# Patient Record
Sex: FEMALE | Race: BLACK OR AFRICAN AMERICAN | Employment: FULL TIME | URBAN - METROPOLITAN AREA
[De-identification: names, ages, dates, MRNs, and addresses within clinical notes are randomized per-mention and may not be internally consistent; named-entity substitution may affect disease eponyms.]

---

## 2018-01-16 ENCOUNTER — OFFICE VISIT (OUTPATIENT)
Dept: OBGYN CLINIC | Age: 23
End: 2018-01-16
Payer: COMMERCIAL

## 2018-01-16 VITALS
BODY MASS INDEX: 20 KG/M2 | SYSTOLIC BLOOD PRESSURE: 106 MMHG | WEIGHT: 132 LBS | HEART RATE: 80 BPM | HEIGHT: 68 IN | DIASTOLIC BLOOD PRESSURE: 59 MMHG | TEMPERATURE: 98.6 F

## 2018-01-16 DIAGNOSIS — Z30.02 ENCOUNTER FOR COUNSELING AND INSTRUCTION IN NATURAL FAMILY PLANNING TO AVOID PREGNANCY: Primary | ICD-10-CM

## 2018-01-16 PROCEDURE — 1036F TOBACCO NON-USER: CPT | Performed by: OBSTETRICS & GYNECOLOGY

## 2018-01-16 PROCEDURE — G8484 FLU IMMUNIZE NO ADMIN: HCPCS | Performed by: OBSTETRICS & GYNECOLOGY

## 2018-01-16 PROCEDURE — G8427 DOCREV CUR MEDS BY ELIG CLIN: HCPCS | Performed by: OBSTETRICS & GYNECOLOGY

## 2018-01-16 PROCEDURE — 99201 PR OFFICE OUTPATIENT NEW 10 MINUTES: CPT | Performed by: OBSTETRICS & GYNECOLOGY

## 2018-01-16 PROCEDURE — G8420 CALC BMI NORM PARAMETERS: HCPCS | Performed by: OBSTETRICS & GYNECOLOGY

## 2018-01-22 ENCOUNTER — TELEPHONE (OUTPATIENT)
Dept: OBGYN CLINIC | Age: 23
End: 2018-01-22

## 2018-01-30 ENCOUNTER — OFFICE VISIT (OUTPATIENT)
Dept: OBGYN CLINIC | Age: 23
End: 2018-01-30
Payer: COMMERCIAL

## 2018-01-30 VITALS
HEIGHT: 68 IN | WEIGHT: 129.1 LBS | SYSTOLIC BLOOD PRESSURE: 111 MMHG | BODY MASS INDEX: 19.57 KG/M2 | DIASTOLIC BLOOD PRESSURE: 67 MMHG | HEART RATE: 67 BPM

## 2018-01-30 DIAGNOSIS — Z30.017 NEXPLANON INSERTION: Primary | ICD-10-CM

## 2018-01-30 PROCEDURE — 11982 REMOVE DRUG IMPLANT DEVICE: CPT | Performed by: ADVANCED PRACTICE MIDWIFE

## 2018-08-02 ENCOUNTER — OFFICE VISIT (OUTPATIENT)
Dept: OBGYN CLINIC | Age: 23
End: 2018-08-02
Payer: COMMERCIAL

## 2018-08-02 ENCOUNTER — HOSPITAL ENCOUNTER (OUTPATIENT)
Age: 23
Setting detail: SPECIMEN
Discharge: HOME | End: 2018-08-02
Payer: COMMERCIAL

## 2018-08-02 VITALS
HEART RATE: 74 BPM | BODY MASS INDEX: 20.73 KG/M2 | HEIGHT: 66 IN | DIASTOLIC BLOOD PRESSURE: 72 MMHG | SYSTOLIC BLOOD PRESSURE: 116 MMHG | WEIGHT: 129 LBS

## 2018-08-02 DIAGNOSIS — N89.8 VAGINAL DISCHARGE: Primary | ICD-10-CM

## 2018-08-02 DIAGNOSIS — R10.9 ABDOMINAL CRAMPING: ICD-10-CM

## 2018-08-02 PROCEDURE — 99213 OFFICE O/P EST LOW 20 MIN: CPT | Performed by: ADVANCED PRACTICE MIDWIFE

## 2018-08-02 NOTE — PROGRESS NOTES
Nancy Napier  1995  8/2/18    SUBJECTIVE    Sloane Napier presents today with a c/o bleeding heavily on her Nexplanon. States that this stopped recently with her last period. She states that her last one was very painful. Also having abdominal pain that occurs daily. She states sometimes she this aligns with period and sometimes it doesn't. States this pain has been going on for more than a year and is increasing in frequency and intensity. When is with a period, she also has migraines and numbness in legs that is when she is crampy. Denies painful bowel movements. States that she does an enema about 3 times a year \"to keep things clean\". States is has discharge now that is not normal for her. Is very nervous about what could possibly be wrong with her. A friend told her she could be barren. OBJECTIVE    Patient's last menstrual period was 07/09/2018 (exact date). Vitals:    08/02/18 1338   BP: 116/72   Pulse: 74       Physical Examination: General appearance - alert, well appearing, and in no distress, oriented to person, place, and time and anxious  Mental status - alert, oriented to person, place, and time, normal mood, behavior, speech, dress, motor activity, and thought processes, anxious  Abdomen - tenderness noted over both left and right adnexa. Also points to pain over pelvis on both left and right side. No hernias appreciated. Pelvic - VAGINA: vaginal tenderness to patients left side    ASSESSMENT & PLAN    Patient Active Problem List   Diagnosis    Nexplanon insertion     1. Cyclic and non-cyclic abdominal and adnexal pain   -- Pelvic US   2. Request for STI testing   -- Vaginitis probe   -- Gc/CT    Over 50% of this 15 minute visit was spent on counseling and education.

## 2018-08-07 ENCOUNTER — HOSPITAL ENCOUNTER (OUTPATIENT)
Dept: ULTRASOUND IMAGING | Age: 23
Discharge: HOME | End: 2018-08-09

## 2018-08-07 ENCOUNTER — HOSPITAL ENCOUNTER (OUTPATIENT)
Age: 23
End: 2018-08-07

## 2018-08-07 DIAGNOSIS — R10.9 ABDOMINAL CRAMPING: ICD-10-CM

## 2018-08-07 PROCEDURE — 76830 TRANSVAGINAL US NON-OB: CPT

## 2018-08-07 PROCEDURE — 76856 US EXAM PELVIC COMPLETE: CPT

## 2018-08-09 LAB
DIRECT EXAM: ABNORMAL
Lab: ABNORMAL
SPECIMEN DESCRIPTION: ABNORMAL
STATUS: ABNORMAL

## 2018-08-10 DIAGNOSIS — N76.0 BV (BACTERIAL VAGINOSIS): Primary | ICD-10-CM

## 2018-08-10 DIAGNOSIS — B96.89 BV (BACTERIAL VAGINOSIS): Primary | ICD-10-CM

## 2018-08-10 LAB
C TRACH DNA GENITAL QL NAA+PROBE: NEGATIVE
N. GONORRHOEAE DNA: NEGATIVE

## 2018-08-10 RX ORDER — METRONIDAZOLE 500 MG/1
500 TABLET ORAL 2 TIMES DAILY
Qty: 14 TABLET | Refills: 0 | Status: SHIPPED | OUTPATIENT
Start: 2018-08-10 | End: 2018-08-14

## 2018-08-14 ENCOUNTER — OFFICE VISIT (OUTPATIENT)
Dept: OBGYN CLINIC | Age: 23
End: 2018-08-14
Payer: COMMERCIAL

## 2018-08-14 VITALS
SYSTOLIC BLOOD PRESSURE: 123 MMHG | DIASTOLIC BLOOD PRESSURE: 75 MMHG | BODY MASS INDEX: 21.18 KG/M2 | WEIGHT: 131.2 LBS | HEART RATE: 80 BPM

## 2018-08-14 DIAGNOSIS — D25.9 UTERINE LEIOMYOMA, UNSPECIFIED LOCATION: Primary | ICD-10-CM

## 2018-08-14 PROCEDURE — G8427 DOCREV CUR MEDS BY ELIG CLIN: HCPCS | Performed by: OBSTETRICS & GYNECOLOGY

## 2018-08-14 PROCEDURE — 1036F TOBACCO NON-USER: CPT | Performed by: OBSTETRICS & GYNECOLOGY

## 2018-08-14 PROCEDURE — 99212 OFFICE O/P EST SF 10 MIN: CPT | Performed by: OBSTETRICS & GYNECOLOGY

## 2018-08-14 PROCEDURE — G8420 CALC BMI NORM PARAMETERS: HCPCS | Performed by: OBSTETRICS & GYNECOLOGY

## 2018-10-18 ENCOUNTER — PROCEDURE VISIT (OUTPATIENT)
Dept: OBGYN CLINIC | Age: 23
End: 2018-10-18
Payer: COMMERCIAL

## 2018-10-18 VITALS
SYSTOLIC BLOOD PRESSURE: 114 MMHG | HEART RATE: 86 BPM | DIASTOLIC BLOOD PRESSURE: 66 MMHG | HEIGHT: 68 IN | BODY MASS INDEX: 20.02 KG/M2 | OXYGEN SATURATION: 99 % | WEIGHT: 132.13 LBS

## 2018-10-18 DIAGNOSIS — Z31.9 DESIRE FOR PREGNANCY: ICD-10-CM

## 2018-10-18 DIAGNOSIS — Z30.46 NEXPLANON REMOVAL: Primary | ICD-10-CM

## 2018-10-18 PROBLEM — Z30.017 NEXPLANON INSERTION: Status: RESOLVED | Noted: 2018-01-30 | Resolved: 2018-10-18

## 2018-10-18 PROCEDURE — 11982 REMOVE DRUG IMPLANT DEVICE: CPT | Performed by: ADVANCED PRACTICE MIDWIFE

## 2018-10-18 RX ORDER — THIAMINE MONONITRATE, RIBOFLAVIN, PYRIDOXINE HYDROCHLORIDE, CYANOCOBALAMIN, ASCORBIC ACID, NIACIN, FOLIC ACID, FERROUS FUMARATE, CALCIUM CARBONATE, CHOLECALCIFEROL, VITAMIN E ACETATE, POTASSIUM IODIDE, ZINC OXIDE, CHOLINE BITARTRATE, AND DOCONEXENT
1 KIT DAILY
Qty: 30 EACH | Refills: 10 | Status: SHIPPED | OUTPATIENT
Start: 2018-10-18

## 2020-04-08 ENCOUNTER — HOSPITAL ENCOUNTER (OUTPATIENT)
Age: 25
Discharge: HOME | End: 2020-04-08

## 2020-04-08 LAB — RUBV IGG SER QL: 204 IU/ML

## 2020-04-08 PROCEDURE — 86787 VARICELLA-ZOSTER ANTIBODY: CPT

## 2020-04-08 PROCEDURE — 86735 MUMPS ANTIBODY: CPT

## 2020-04-08 PROCEDURE — 86765 RUBEOLA ANTIBODY: CPT

## 2020-04-08 PROCEDURE — 86481 TB AG RESPONSE T-CELL SUSP: CPT

## 2020-04-08 PROCEDURE — 86762 RUBELLA ANTIBODY: CPT

## 2020-04-10 LAB
MEASLES IMMUNE (IGG): 1.18
MUV IGG SER QL: 4.73
VZV IGG SER QL IA: 1.34

## 2020-04-11 LAB — T-SPOT TB TEST: NORMAL

## 2020-09-15 ENCOUNTER — EMPLOYEE WELLNESS (OUTPATIENT)
Dept: OTHER | Age: 25
End: 2020-09-15

## 2020-09-15 LAB
CHOLESTEROL/HDL RATIO: 1.9
CHOLESTEROL: 160 MG/DL
GLUCOSE BLD-MCNC: 83 MG/DL (ref 70–99)
HDLC SERPL-MCNC: 86 MG/DL
LDL CHOLESTEROL: 67 MG/DL (ref 0–130)
PATIENT FASTING?: YES
TRIGL SERPL-MCNC: 33 MG/DL
VLDLC SERPL CALC-MCNC: NORMAL MG/DL (ref 1–30)

## 2020-10-19 VITALS — BODY MASS INDEX: 21.44 KG/M2 | WEIGHT: 141 LBS

## 2021-05-18 ENCOUNTER — IMMUNIZATION (OUTPATIENT)
Dept: PRIMARY CARE CLINIC | Age: 26
End: 2021-05-18
Payer: OTHER GOVERNMENT

## 2021-05-18 PROCEDURE — 0001A COVID-19, PFIZER VACCINE 30MCG/0.3ML DOSE: CPT | Performed by: INTERNAL MEDICINE

## 2021-05-18 PROCEDURE — 91300 COVID-19, PFIZER VACCINE 30MCG/0.3ML DOSE: CPT | Performed by: INTERNAL MEDICINE

## 2021-06-17 ENCOUNTER — IMMUNIZATION (OUTPATIENT)
Dept: PRIMARY CARE CLINIC | Age: 26
End: 2021-06-17
Payer: OTHER GOVERNMENT

## 2021-06-17 PROCEDURE — 0002A COVID-19, PFIZER VACCINE 30MCG/0.3ML DOSE: CPT | Performed by: INTERNAL MEDICINE

## 2021-06-17 PROCEDURE — 91300 COVID-19, PFIZER VACCINE 30MCG/0.3ML DOSE: CPT | Performed by: INTERNAL MEDICINE

## 2021-09-14 ENCOUNTER — HOSPITAL ENCOUNTER (EMERGENCY)
Age: 26
Discharge: HOME | End: 2021-09-14
Attending: EMERGENCY MEDICINE

## 2021-09-14 ENCOUNTER — APPOINTMENT (OUTPATIENT)
Dept: GENERAL RADIOLOGY | Age: 26
End: 2021-09-14

## 2021-09-14 VITALS
BODY MASS INDEX: 20.98 KG/M2 | SYSTOLIC BLOOD PRESSURE: 125 MMHG | DIASTOLIC BLOOD PRESSURE: 89 MMHG | RESPIRATION RATE: 18 BRPM | TEMPERATURE: 98.1 F | OXYGEN SATURATION: 100 % | WEIGHT: 138 LBS | HEART RATE: 67 BPM

## 2021-09-14 DIAGNOSIS — R07.9 CHEST PAIN, UNSPECIFIED TYPE: Primary | ICD-10-CM

## 2021-09-14 LAB
ABSOLUTE EOS #: 0.1 K/UL (ref 0–0.4)
ABSOLUTE IMMATURE GRANULOCYTE: ABNORMAL K/UL (ref 0–0.3)
ABSOLUTE LYMPH #: 1.1 K/UL (ref 1–4.8)
ABSOLUTE MONO #: 0.4 K/UL (ref 0.1–1.2)
ANION GAP SERPL CALCULATED.3IONS-SCNC: 9 MMOL/L (ref 9–17)
BASOPHILS # BLD: 1 % (ref 0–2)
BASOPHILS ABSOLUTE: 0 K/UL (ref 0–0.2)
BUN BLDV-MCNC: 7 MG/DL (ref 6–20)
BUN/CREAT BLD: ABNORMAL (ref 9–20)
CALCIUM SERPL-MCNC: 9.2 MG/DL (ref 8.6–10.4)
CHLORIDE BLD-SCNC: 106 MMOL/L (ref 98–107)
CO2: 25 MMOL/L (ref 20–31)
CREAT SERPL-MCNC: 0.48 MG/DL (ref 0.5–0.9)
D-DIMER QUANTITATIVE: 0.46 MG/L FEU
DIFFERENTIAL TYPE: ABNORMAL
EOSINOPHILS RELATIVE PERCENT: 2 % (ref 1–4)
GFR AFRICAN AMERICAN: >60 ML/MIN
GFR NON-AFRICAN AMERICAN: >60 ML/MIN
GFR SERPL CREATININE-BSD FRML MDRD: ABNORMAL ML/MIN/{1.73_M2}
GFR SERPL CREATININE-BSD FRML MDRD: ABNORMAL ML/MIN/{1.73_M2}
GLUCOSE BLD-MCNC: 87 MG/DL (ref 70–99)
HCG QUALITATIVE: NEGATIVE
HCT VFR BLD CALC: 30.9 % (ref 36–46)
HEMOGLOBIN: 10 G/DL (ref 12–16)
IMMATURE GRANULOCYTES: ABNORMAL %
LYMPHOCYTES # BLD: 34 % (ref 24–44)
MCH RBC QN AUTO: 27.9 PG (ref 26–34)
MCHC RBC AUTO-ENTMCNC: 32.3 G/DL (ref 31–37)
MCV RBC AUTO: 86.5 FL (ref 80–100)
MONOCYTES # BLD: 12 % (ref 2–11)
NRBC AUTOMATED: ABNORMAL PER 100 WBC
PDW BLD-RTO: 17.9 % (ref 12.5–15.4)
PLATELET # BLD: 197 K/UL (ref 140–450)
PLATELET ESTIMATE: ABNORMAL
PMV BLD AUTO: 9.1 FL (ref 6–12)
POTASSIUM SERPL-SCNC: 3.7 MMOL/L (ref 3.7–5.3)
RBC # BLD: 3.57 M/UL (ref 4–5.2)
RBC # BLD: ABNORMAL 10*6/UL
SEG NEUTROPHILS: 51 % (ref 36–66)
SEGMENTED NEUTROPHILS ABSOLUTE COUNT: 1.7 K/UL (ref 1.8–7.7)
SODIUM BLD-SCNC: 140 MMOL/L (ref 135–144)
TROPONIN INTERP: NORMAL
TROPONIN T: NORMAL NG/ML
TROPONIN, HIGH SENSITIVITY: <6 NG/L (ref 0–14)
WBC # BLD: 3.4 K/UL (ref 3.5–11)
WBC # BLD: ABNORMAL 10*3/UL

## 2021-09-14 PROCEDURE — 80048 BASIC METABOLIC PNL TOTAL CA: CPT

## 2021-09-14 PROCEDURE — 84703 CHORIONIC GONADOTROPIN ASSAY: CPT

## 2021-09-14 PROCEDURE — 71045 X-RAY EXAM CHEST 1 VIEW: CPT

## 2021-09-14 PROCEDURE — 36415 COLL VENOUS BLD VENIPUNCTURE: CPT

## 2021-09-14 PROCEDURE — 84484 ASSAY OF TROPONIN QUANT: CPT

## 2021-09-14 PROCEDURE — 85379 FIBRIN DEGRADATION QUANT: CPT

## 2021-09-14 PROCEDURE — 85025 COMPLETE CBC W/AUTO DIFF WBC: CPT

## 2021-09-14 PROCEDURE — 99284 EMERGENCY DEPT VISIT MOD MDM: CPT

## 2021-09-14 PROCEDURE — 93005 ELECTROCARDIOGRAM TRACING: CPT | Performed by: EMERGENCY MEDICINE

## 2021-09-14 ASSESSMENT — PAIN SCALES - GENERAL: PAINLEVEL_OUTOF10: 9

## 2021-09-14 NOTE — ED PROVIDER NOTES
[id number] [facility] ED  [address] [facility] [address]  Phone: [phone]  Fax: [address] Barrient[address] [phone]      Pt Name: Fish Alfonso  MRN: [id number]  Elena 1995  Date of evaluation: 9/14/2021    CHIEF COMPLAINT       Chief Complaint   Patient presents with    Anxiety     left sided chest discomfort upon exertion       HISTORY OF PRESENT ILLNESS    Mela Severin Agbornduku is a 27 y.o. female who presents to the emergency department complaining of left precordial chest pain that occurred while she was at work around 4:00 this morning. She checked her blood pressure and she had a systolic of 55 checked it again a couple minutes later was still low. She denies any other associated symptoms with it nonradiating. No shortness of breath no diaphoresis or lightheadedness. On arrival here blood pressure normal. She says be been dealing with chest pain in the left side for up to 4 years and when she runs it feels tight and painful and she has to hold her chest. She has never sought any medical work-up for this complaint. Today she thought something was seriously wrong. Non-smoker. No fevers chills cough or congestion. No other symptoms. REVIEW OF SYSTEMS       Constitutional: No fevers or chills   HEENT: No sore throat, rhinorrhea, or earache   Eyes: No blurry vision or double vision no drainage   Cardiovascular: Positive chest pain no tachycardia  Respiratory: No wheezing or shortness of breath no cough   Gastrointestinal: No nausea, vomiting, diarrhea, constipation, or abdominal pain   : No hematuria or dysuria   Musculoskeletal: No swelling or pain   Skin: No rash   Neurological: No focal neurologic complaints, paresthesias, weakness, or headache     PAST MEDICAL HISTORY    has no past medical history on file. SURGICAL HISTORY      has a past surgical history that includes Umbilical hernia repair.     CURRENT MEDICATIONS       Previous Medications    ETONOGESTREL (NEXPLANON) 68 MG IMPLANT    68 mg by Subdermal route once    PRENATAL-FEFUM-FA-DHA W/O A (PRENATAL + DHA) 27-1 & 250 MG THPK    Take 1 tablet by mouth daily       ALLERGIES     is allergic to flagyl [metronidazole]. FAMILY HISTORY     She indicated that her mother is alive. She indicated that her father is alive. She indicated that all of her five sisters are alive. She indicated that all of her three brothers are alive. family history is not on file. SOCIAL HISTORY      reports that she has never smoked. She has never used smokeless tobacco. She reports current alcohol use. She reports that she does not use drugs. PHYSICAL EXAM       ED Triage Vitals [09/14/21 0654]   BP Temp Temp Source Pulse Resp SpO2 Height Weight   125/89 98.1 °F (36.7 °C) Oral 67 18 100 % -- 138 lb (62.6 kg)       Constitutional: Alert, oriented x3, nontoxic, answering questions appropriately, acting properly for age, in no acute distress   HEENT: Extraocular muscles intact,  Neck: Trachea midline   Cardiovascular: Regular rhythm and rate no S3, S4, or murmurs   Respiratory: Clear to auscultation bilaterally no wheezes, rhonchi, rales, no respiratory distress no tachypnea no retractions no hypoxia  Gastrointestinal: Soft, nontender, nondistended, positive bowel sounds. No rebound, rigidity, or guarding. Musculoskeletal: No extremity pain or swelling no calf tenderness or asymmetry  Neurologic: Moving all 4 extremities without difficulty there are no gross focal neurologic deficits   Skin: Warm and dry       DIFFERENTIAL DIAGNOSIS/ MDM:     Labs EKG rule out cardiac etiology. Low suspicion.       HEART Risk Score:   0 = History   Highly Suspicious   2    Moderately Suspicious   1    Slight Suspicious  0  0 = EKG  Significant ST Depression 2    Nonspecific   1    Normal    0  0 = Age > or = 65   2    > 45 to < 65   1    < or = 45   0  0 = Risk Factors > or = 3   2    1 or 2    1    0    0  0 = Troponin > or = 3 times normal limit 2    > 1 and < 3 times limit  1    Normal    0  0 = Score  0 - 3    Low Risk     4 - 6    Moderate risk     7 - 10    High Risk  * Risk Factors include: Diabetes, Tobacco use, Hypertension, Hyperlipidemia, Family History of CAD and Obesity    PERC Criteria     n = Age      > or = 50  n = Heart Rate     > or = 100  n = Pulse Oximetry     < or = 95%  n = Previous History of DVT   Yes / No  n = Trauma or Surgery within 4 Weeks Yes / No  n = Hemoptysis    Yes / No  y = Exogenous Estrogen use  Yes / No  n = Unilateral Leg Swelling   Yes / No    Risk Criteria for Thoracic Aortic Dissection:     n = Sudden Onset, Maximal at Onset, with radiation to back  n = Neurologic Deficits with Chest Pain  n = Connective Tissue Disorder such as Marfan's or Eduardo-Danlos  n = History of Aortic Valvular Disease  n = Pregnancy  n = Family History of Dissection  Elevated D-Dimer with any of the above      DIAGNOSTIC RESULTS     EKG: All EKG's are interpreted by the Emergency Department Physician who either signs or Co-signs this chart in the absence of a cardiologist.    833 sinus rhythm rate 59  QRS 76  no acute ST or T wave changes.     Not indicated unless otherwise documented above    LABS:  Results for orders placed or performed during the hospital encounter of 09/14/21   Troponin   Result Value Ref Range    Troponin, High Sensitivity <6 0 - 14 ng/L    Troponin T NOT REPORTED <0.03 ng/mL    Troponin Interp NOT REPORTED    CBC Auto Differential   Result Value Ref Range    WBC 3.4 (L) 3.5 - 11.0 k/uL    RBC 3.57 (L) 4.0 - 5.2 m/uL    Hemoglobin 10.0 (L) 12.0 - 16.0 g/dL    Hematocrit 30.9 (L) 36 - 46 %    MCV 86.5 80 - 100 fL    MCH 27.9 26 - 34 pg    MCHC 32.3 31 - 37 g/dL    RDW 17.9 (H) 12.5 - 15.4 %    Platelets 775 863 - 988 k/uL    MPV 9.1 6.0 - 12.0 fL    NRBC Automated NOT REPORTED per 100 WBC    Differential Type NOT REPORTED     Seg Neutrophils 51 36 - 66 %    Lymphocytes 34 24 - 44 %    Monocytes 12 (H) 2 - 11 %    Eosinophils % 2 1 - 4 %    Basophils 1 0 - 2 %    Immature Granulocytes NOT REPORTED 0 %    Segs Absolute 1.70 (L) 1.8 - 7.7 k/uL    Absolute Lymph # 1.10 1.0 - 4.8 k/uL    Absolute Mono # 0.40 0.1 - 1.2 k/uL    Absolute Eos # 0.10 0.0 - 0.4 k/uL    Basophils Absolute 0.00 0.0 - 0.2 k/uL    Absolute Immature Granulocyte NOT REPORTED 0.00 - 0.30 k/uL    WBC Morphology NOT REPORTED     RBC Morphology NOT REPORTED     Platelet Estimate NOT REPORTED    Basic Metabolic Panel   Result Value Ref Range    Glucose 87 70 - 99 mg/dL    BUN 7 6 - 20 mg/dL    CREATININE 0.48 (L) 0.50 - 0.90 mg/dL    Bun/Cre Ratio NOT REPORTED 9 - 20    Calcium 9.2 8.6 - 10.4 mg/dL    Sodium 140 135 - 144 mmol/L    Potassium 3.7 3.7 - 5.3 mmol/L    Chloride 106 98 - 107 mmol/L    CO2 25 20 - 31 mmol/L    Anion Gap 9 9 - 17 mmol/L    GFR Non-African American >60 >60 mL/min    GFR African American >60 >60 mL/min    GFR Comment          GFR Staging NOT REPORTED    D-Dimer, Quantitative   Result Value Ref Range    D-Dimer, Quant 0.46 mg/L FEU   HCG Qualitative, Serum   Result Value Ref Range    hCG Qual NEGATIVE NEGATIVE       Not indicated unless otherwise documented above    RADIOLOGY:   I reviewed the radiologist interpretations:    XR CHEST PORTABLE   Final Result   Negative portable frontal view of the chest.  No evidence of acute   cardiopulmonary process. Not indicated unless otherwise documented above    EMERGENCY DEPARTMENT COURSE:     The patient was given the following medications:  No orders of the defined types were placed in this encounter. Vitals:   -------------------------  /89   Pulse 67   Temp 98.1 °F (36.7 °C) (Oral)   Resp 18   Wt 62.6 kg (138 lb)   SpO2 100%   BMI 20.98 kg/m²     9 AM resting in no acute distress. EKG unremarkable chest x-ray no infiltrate thorax. Troponin negative D-dimer negative. Mild anemia. Patient will require follow-up.   She has had

## 2021-09-14 NOTE — ED NOTES
Patient arrived to ER with complaints of low blood pressure from home blood pressure device when assessing it at 0400hrs when she woke up from home. States blood pressure was 55/45. Also states left sided chest pain upon activity but not at rest. Denies any shortness of breath or any other complaints.       Chantal Cid RN  09/14/21 2204

## 2021-09-15 LAB
EKG ATRIAL RATE: 59 BPM
EKG P AXIS: 49 DEGREES
EKG P-R INTERVAL: 190 MS
EKG Q-T INTERVAL: 408 MS
EKG QRS DURATION: 76 MS
EKG QTC CALCULATION (BAZETT): 403 MS
EKG R AXIS: 65 DEGREES
EKG T AXIS: 72 DEGREES
EKG VENTRICULAR RATE: 59 BPM

## 2022-02-10 ENCOUNTER — TELEPHONE (OUTPATIENT)
Dept: PRIMARY CARE CLINIC | Age: 27
End: 2022-02-10

## 2022-02-10 NOTE — TELEPHONE ENCOUNTER
----- Message from Karen Rutledge sent at 2/10/2022 12:32 PM EST -----  Subject: Referral Request    QUESTIONS   Reason for referral request? Pt requesting order be placed for chest x-ray   for school as previous one has . Has the physician seen you for this condition before? No   Preferred Specialist (if applicable)? Do you already have an appointment scheduled? Additional Information for Provider?   ---------------------------------------------------------------------------  --------------  CALL BACK INFO  What is the best way for the office to contact you? OK to leave message on   voicemail  Preferred Call Back Phone Number?  [phone]